# Patient Record
Sex: FEMALE | Race: WHITE | RURAL
[De-identification: names, ages, dates, MRNs, and addresses within clinical notes are randomized per-mention and may not be internally consistent; named-entity substitution may affect disease eponyms.]

---

## 2017-02-28 DIAGNOSIS — F32.A DEPRESSION, UNSPECIFIED DEPRESSION TYPE: ICD-10-CM

## 2017-02-28 RX ORDER — ESCITALOPRAM OXALATE 5 MG/1
5 TABLET ORAL DAILY
Qty: 90 TAB | Refills: 1 | Status: SHIPPED | OUTPATIENT
Start: 2017-02-28 | End: 2017-08-07

## 2017-02-28 NOTE — TELEPHONE ENCOUNTER
Requested Prescriptions     Pending Prescriptions Disp Refills    escitalopram oxalate (LEXAPRO) 5 mg tablet 90 Tab 1     Sig: Take 1 Tab by mouth daily.        Last filled:         9/2/16            #90        1 refill

## 2017-08-07 ENCOUNTER — OFFICE VISIT (OUTPATIENT)
Dept: FAMILY MEDICINE CLINIC | Age: 67
End: 2017-08-07

## 2017-08-07 VITALS
WEIGHT: 137.4 LBS | TEMPERATURE: 96.6 F | HEART RATE: 68 BPM | DIASTOLIC BLOOD PRESSURE: 75 MMHG | RESPIRATION RATE: 16 BRPM | BODY MASS INDEX: 26.83 KG/M2 | SYSTOLIC BLOOD PRESSURE: 130 MMHG

## 2017-08-07 DIAGNOSIS — Z12.11 COLON CANCER SCREENING: ICD-10-CM

## 2017-08-07 DIAGNOSIS — M19.90 ARTHRITIS: ICD-10-CM

## 2017-08-07 DIAGNOSIS — L98.9 SKIN LESION: ICD-10-CM

## 2017-08-07 DIAGNOSIS — Z00.00 MEDICARE ANNUAL WELLNESS VISIT, SUBSEQUENT: Primary | ICD-10-CM

## 2017-08-07 DIAGNOSIS — L71.9 ROSACEA: ICD-10-CM

## 2017-08-07 RX ORDER — SALICYLIC ACID 6 MG/100ML
1 SHAMPOO TOPICAL 2 TIMES WEEKLY
Qty: 1 BOTTLE | Refills: 3 | Status: SHIPPED | OUTPATIENT
Start: 2017-08-07

## 2017-08-07 RX ORDER — DICLOFENAC SODIUM 10 MG/G
4 GEL TOPICAL 4 TIMES DAILY
Qty: 200 G | Refills: 3 | Status: SHIPPED | OUTPATIENT
Start: 2017-08-07

## 2017-08-07 NOTE — PROGRESS NOTES
Chief Complaint   Patient presents with    Medication Refill     lexapro; pt stopped taking 1 month ago; pt says med makes her too drowsy       Kimberly Dietrich is a 78 y.o. female and presents for annual Medicare Wellness Visit. Problem List: Reviewed with patient and discussed risk factors. Patient Active Problem List   Diagnosis Code    Chronic pain G89.29    Migraines G43.909    Rosacea L71.9    Advance directive discussed with patient Z70.80    Osteopenia M85.80    Depression F32.9       Current medical providers:  Patient Care Team:  Nel Gant MD as PCP - General (Family Practice)    PSH: Reviewed with patient  Past Surgical History:   Procedure Laterality Date    HX ORTHOPAEDIC      joint reduction right big toe    HX TONSIL AND ADENOIDECTOMY          SH: Reviewed with patient  Social History   Substance Use Topics    Smoking status: Never Smoker    Smokeless tobacco: Never Used    Alcohol use No       FH: Reviewed with patient  Family History   Problem Relation Age of Onset    Coronary Artery Disease Mother     Diabetes Mother     Heart Disease Mother     Heart Disease Father     Lung Disease Father     Cancer Maternal Grandmother     Arthritis-osteo Maternal Grandfather     Heart Disease Maternal Grandfather     Cancer Paternal Grandmother     Cancer Paternal Grandfather        Medications/Allergies: Reviewed with patient  Current Outpatient Prescriptions on File Prior to Visit   Medication Sig Dispense Refill    diclofenac (VOLTAREN) 1 % gel Apply 4 g to affected area four (4) times daily. 269 g 3    salicylic acid 6 % sham 1 Applicator by Apply Externally route two (2) times a week. 1 Bottle 3    Dapsone (ACZONE) 5 % gel Apply 1 g to affected area two (2) times a day. 1 Tube 1    turmeric root extract 500 mg cap Take  by mouth three (3) times daily.  naproxen sodium (NAPROSYN) 220 mg tablet Take 220 mg by mouth two (2) times daily (with meals).       cromolyn (NASALCROM) 5.2 mg/spray (4 %) nasal spray 1 Spray four (4) times daily.  chlorpheniramine (CHLOR-TRIMETRON) 4 mg tablet Take 4 mg by mouth every six (6) hours as needed for Allergies.  butterbur root extract 50 mg cap Take  by mouth two (2) times a day.  methylsulfonylmethane (MSM) 1,000 mg cap Take  by mouth two (2) times a day.  fexofenadine (ALLEGRA) 180 mg tablet Take 180 mg by mouth as needed.  fluticasone (FLONASE) 50 mcg/actuation nasal spray 2 Sprays by Both Nostrils route daily. 1 Bottle 11    estradiol (ESTRACE) 0.01 % (0.1 mg/gram) vaginal cream daily 42.5 g 11    escitalopram oxalate (LEXAPRO) 5 mg tablet Take 1 Tab by mouth daily. 90 Tab 1    calcium-cholecalciferol, D3, (CALTRATE 600+D) tablet Take 1 Tab by mouth two (2) times a day.  triamcinolone acetonide (KENALOG) 0.025 % topical cream Apply  to affected area two (2) times a day. use thin layer 15 g 0     No current facility-administered medications on file prior to visit. Allergies   Allergen Reactions    Mercury (Elemental) Unknown (comments)    Sulfa (Sulfonamide Antibiotics) Unknown (comments)    Tetracyclines Unknown (comments)       Objective:  Visit Vitals    /75 (BP 1 Location: Left arm, BP Patient Position: Sitting)    Pulse 68    Temp 96.6 °F (35.9 °C) (Oral)    Resp 16    Wt 137 lb 6.4 oz (62.3 kg)    BMI 26.83 kg/m2    Body mass index is 26.83 kg/(m^2).     Assessment of cognitive impairment: Alert and oriented x 3    Depression Screen:   PHQ over the last two weeks 8/7/2017   Little interest or pleasure in doing things Several days   Feeling down, depressed or hopeless Several days   Total Score PHQ 2 2   Trouble falling or staying asleep, or sleeping too much -   Feeling tired or having little energy -   Poor appetite or overeating -   Feeling bad about yourself - or that you are a failure or have let yourself or your family down -   Trouble concentrating on things such as school, work, reading or watching TV -   Moving or speaking so slowly that other people could have noticed; or the opposite being so fidgety that others notice -   Thoughts of being better off dead, or hurting yourself in some way -   PHQ 9 Score -   How difficult have these problems made it for you to do your work, take care of your home and get along with others -       Fall Risk Assessment:    Fall Risk Assessment, last 12 mths 8/7/2017   Able to walk? Yes   Fall in past 12 months? No       Functional Ability:   Does the patient exhibit a steady gait? yes   How long did it take the patient to get up and walk from a sitting position? 2sec   Is the patient self reliant?  (ie can do own laundry, meals, household chores)  yes     Does the patient handle his/her own medications? yes     Does the patient handle his/her own money? yes     Is the patients home safe (ie good lighting, handrails on stairs and bath, etc.)? yes     Did you notice or did patient express any hearing difficulties? no     Did you notice or did patient express any vision difficulties?   no     Were distance and reading eye charts used? no       Advance Care Planning:   Patient was offered the opportunity to discuss advance care planning:  yes     Does patient have an Advance Directive:  yes   If no, did you provide information on Caring Connections? yes       Plan:      No orders of the defined types were placed in this encounter.       Health Maintenance   Topic Date Due    DTaP/Tdap/Td series (1 - Tdap) 07/08/1972    FOBT Q 1 YEAR AGE 50-75  07/08/2001    ZOSTER VACCINE AGE 60>  05/08/2011    GLAUCOMA SCREENING Q2Y  07/08/2016    BREAST CANCER SCRN MAMMOGRAM  07/24/2017    INFLUENZA AGE 9 TO ADULT  08/01/2017    MEDICARE YEARLY EXAM  08/05/2017    Pneumococcal 65+ Low/Medium Risk (2 of 2 - PPSV23) 12/05/2017    Hepatitis C Screening  Addressed    OSTEOPOROSIS SCREENING (DEXA)  Completed       *Patient verbalized understanding and agreement with the plan. A copy of the After Visit Summary with personalized health plan was given to the patient today.     Health Maintenance Due   Topic Date Due    DTaP/Tdap/Td series (1 - Tdap) 07/08/1972    FOBT Q 1 YEAR AGE 50-75  07/08/2001     ZOSTER VACCINE AGE 60>  05/08/2011    GLAUCOMA SCREENING Q2Y  07/08/2016 [facility] (Dr. Neri Parks)    BREAST CANCER SCRN MAMMOGRAM  07/24/2017 sees Dr. Cely Walls in [facility] AGE 9 TO ADULT  08/01/2017    MEDICARE YEARLY EXAM  08/05/2017   Nehemias Martinez LPN

## 2017-08-07 NOTE — MR AVS SNAPSHOT
Visit Information Date & Time Provider Department Dept. Phone Encounter #  
 8/7/2017  2:00 PM Cesario Soto  [facility] [phone] [id number] Follow-up Instructions Follow-up and Disposition History Your Appointments 8/9/2018  3:00 PM  
Medicare Physical with Cesario Soto MD  
[facility] ([facility]) Appt Note: ,CP$0/km/8. 7.2017  
 [address] [facility] [phone]  
[phone]  
  
   
 [address] [address] Upcoming Health Maintenance Date Due FOBT Q 1 YEAR AGE 50-75 7/8/2001 GLAUCOMA SCREENING Q2Y 7/8/2016 BREAST CANCER SCRN MAMMOGRAM 7/24/2017 Pneumococcal 65+ Low/Medium Risk (2 of 2 - PPSV23) 12/5/2017 MEDICARE YEARLY EXAM 8/8/2018 DTaP/Tdap/Td series (2 - Td) 8/7/2027 Allergies as of 8/7/2017  Review Complete On: 8/7/2017 By: Sushma Concepcion LPN Severity Noted Reaction Type Reactions Mercury (Elemental)  05/16/2013    Unknown (comments) Sulfa (Sulfonamide Antibiotics)  05/16/2013    Unknown (comments) Tetracyclines  05/16/2013    Unknown (comments) Current Immunizations  Reviewed on 5/16/2013 Name Date Influenza High Dose Vaccine PF 12/5/2016 Influenza Vaccine 11/26/2012 Pneumococcal Conjugate (PCV-13) 12/5/2016 Not reviewed this visit You Were Diagnosed With   
  
 Codes Comments Medicare annual wellness visit, subsequent    -  Primary ICD-10-CM: Z00.00 ICD-9-CM: V70.0 Colon cancer screening     ICD-10-CM: Z12.11 ICD-9-CM: V76.51 Arthritis     ICD-10-CM: M19.90 ICD-9-CM: 716.90 Rosacea     ICD-10-CM: L71.9 ICD-9-CM: 695.3 Skin lesion     ICD-10-CM: L98.9 ICD-9-CM: 709.9 Vitals  BP Pulse Temp Resp Weight(growth percentile) BMI  
 130/75 (BP 1 Location: Left arm, BP Patient Position: Sitting) 68 96.6 °F (35.9 °C) (Oral) 16 137 lb 6.4 oz (62.3 kg) 26.83 kg/m2 OB Status Smoking Status Postmenopausal Never Smoker BMI and BSA Data Body Mass Index Body Surface Area  
 26.83 kg/m 2 1.62 m 2 Preferred Pharmacy Pharmacy Name Phone [facility] [address], [address] [place] [address] Your Updated Medication List  
  
   
This list is accurate as of: 8/7/17  3:29 PM.  Always use your most recent med list. ALLEGRA 180 mg tablet Generic drug:  fexofenadine Take 180 mg by mouth as needed. butterbur root extract 50 mg Cap Take  by mouth two (2) times a day. chlorpheniramine 4 mg tablet Commonly known as:  CHLOR-TRIMETRON Take 4 mg by mouth every six (6) hours as needed for Allergies. cromolyn 5.2 mg/spray (4 %) nasal spray Commonly known as:  NASALCROM  
1 Spray four (4) times daily. Dapsone 5 % Gel Commonly known as:  ACZONE Apply 1 g to affected area two (2) times a day. diclofenac 1 % Gel Commonly known as:  VOLTAREN Apply 4 g to affected area four (4) times daily. estradiol 0.01 % (0.1 mg/gram) vaginal cream  
Commonly known as:  ESTRACE  
daily  
  
 fluticasone 50 mcg/actuation nasal spray Commonly known as:  Eloy Elana 2 Sprays by Both Nostrils route daily. MSM 1,000 mg Cap Generic drug:  methylsulfonylmethane Take  by mouth two (2) times a day. naproxen sodium 220 mg tablet Commonly known as:  NAPROSYN Take 220 mg by mouth two (2) times daily (with meals). salicylic acid 6 % Sham  
1 Applicator by Apply Externally route two (2) times a week. turmeric root extract 500 mg Cap Take  by mouth three (3) times daily. Prescriptions Sent to Pharmacy Refills  
 diclofenac (VOLTAREN) 1 % gel 3 Sig: Apply 4 g to affected area four (4) times daily.   
 Class: Normal  
 [facility], [address] [place] [address] [address] Ph #: [phone] Route: Topical  
 salicylic acid 6 % sham 3 Si Applicator by Apply Externally route two (2) times a week. Class: Normal  
 Pharmacy: [facility] [address], [address] [place] [address] [address] Ph #: [phone] Route: Apply Externally We Performed the Following CBC WITH AUTOMATED DIFF [20094 CPT(R)] METABOLIC PANEL, COMPREHENSIVE [76184 CPT(R)] To-Do List   
 2017 Lab:  OCCULT BLOOD, IMMUNOASSAY (FIT) Patient Instructions Schedule of Personalized Health Plan (Provide Copy to Patient) The best way to stay healthy is to live a healthy lifestyle. A healthy lifestyle includes regular exercise, eating a well-balanced diet, keeping a healthy weight and not smoking. Regular physical exams and screening tests are another important way to take care of yourself. Preventive exams provided by health care providers can find health problems early when treatment works best and can keep you from getting certain diseases or illnesses. Preventive services include exams, lab tests, screenings, shots, monitoring and information to help you take care of your own health. All people over 65 should have a pneumonia shot. Pneumonia shots are usually only needed once in a lifetime unless your doctor decides differently. All people over 65 should have a yearly flu shot. People over 65 are at medium to high risk for Hepatitis B. Three shots are needed for complete protection. In addition to your physical exam, some screening tests are recommended: 
 
Bone mass measurement (dexa scan) is recommended every two years Diabetes Mellitus screening is recommended every year. Glaucoma is an eye disease caused by high pressure in the eye. An eye exam is recommended every year.   
 
Cardiovascular screening tests that check your cholesterol and other blood fat (lipid) levels are recommended every five years. Colorectal Cancer screening tests help to find pre-cancerous polyps (growths in the colon) so they can be removed before they turn into cancer. Tests ordered for screening depend on your personal and family history risk factors. Screening for Breast Cancer is recommended yearly with a mammogram. 
 
Screening for Cervical Cancer is recommended every two years (annually for certain risk factors, such as previous history of STD or abnormal PAP in past 7 years), with a Pelvic Exam with PAP Here is a list of your current Health Maintenance items with a due date: 
Health Maintenance Topic Date Due  
 DTaP/Tdap/Td series (1 - Tdap) 07/08/1972  
 FOBT Q 1 YEAR AGE 50-75  07/08/2001  ZOSTER VACCINE AGE 60>  05/08/2011  GLAUCOMA SCREENING Q2Y  07/08/2016  BREAST CANCER SCRN MAMMOGRAM  07/24/2017  INFLUENZA AGE 9 TO ADULT  08/01/2017  MEDICARE YEARLY EXAM  08/05/2017  Pneumococcal 65+ Low/Medium Risk (2 of 2 - PPSV23) 12/05/2017  Hepatitis C Screening  Addressed  OSTEOPOROSIS SCREENING (DEXA)  Completed Introducing [facility]! [employer] introduces [employer] patient portal. Now you can access parts of your medical record, email your doctor's office, and request medication refills online. 1. In your internet browser, go to [email or web address] [employer] 2. Click on the First Time User? Click Here link in the Sign In box. You will see the New Member Sign Up page. 3. Enter your [employer] Access Code exactly as it appears below. You will not need to use this code after youve completed the sign-up process. If you do not sign up before the expiration date, you must request a new code. · [employer] Access Code: [email or web address] Expires: 11/5/2017  2:11 PM 
 
4. Enter the last four digits of your Social Security Number (xxxx) and Date of Birth (mm/dd/yyyy) as indicated and click Submit.  You will be taken to the next sign-up page. 5. Create a [employer] ID. This will be your [employer] login ID and cannot be changed, so think of one that is secure and easy to remember. 6. Create a [employer] password. You can change your password at any time. 7. Enter your Password Reset Question and Answer. This can be used at a later time if you forget your password. 8. Enter your e-mail address. You will receive e-mail notification when new information is available in [address]. 9. Click Sign Up. You can now view and download portions of your medical record. 10. Click the Download Summary menu link to download a portable copy of your medical information. If you have questions, please visit the Frequently Asked Questions section of the [employer] website. Remember, [employer] is NOT to be used for urgent needs. For medical emergencies, dial 911. Now available from your iPhone and Android! Please provide this summary of care documentation to your next provider. Your primary care clinician is listed as Dania Mullins. If you have any questions after today's visit, please call [phone].

## 2017-08-07 NOTE — PATIENT INSTRUCTIONS
Schedule of Personalized Health Plan  (Provide Copy to Patient)  The best way to stay healthy is to live a healthy lifestyle. A healthy lifestyle includes regular exercise, eating a well-balanced diet, keeping a healthy weight and not smoking. Regular physical exams and screening tests are another important way to take care of yourself. Preventive exams provided by health care providers can find health problems early when treatment works best and can keep you from getting certain diseases or illnesses. Preventive services include exams, lab tests, screenings, shots, monitoring and information to help you take care of your own health. All people over 65 should have a pneumonia shot. Pneumonia shots are usually only needed once in a lifetime unless your doctor decides differently. All people over 65 should have a yearly flu shot. People over 65 are at medium to high risk for Hepatitis B. Three shots are needed for complete protection. In addition to your physical exam, some screening tests are recommended:    Bone mass measurement (dexa scan) is recommended every two years  Diabetes Mellitus screening is recommended every year. Glaucoma is an eye disease caused by high pressure in the eye. An eye exam is recommended every year. Cardiovascular screening tests that check your cholesterol and other blood fat (lipid) levels are recommended every five years. Colorectal Cancer screening tests help to find pre-cancerous polyps (growths in the colon) so they can be removed before they turn into cancer. Tests ordered for screening depend on your personal and family history risk factors.     Screening for Breast Cancer is recommended yearly with a mammogram.    Screening for Cervical Cancer is recommended every two years (annually for certain risk factors, such as previous history of STD or abnormal PAP in past 7 years), with a Pelvic Exam with PAP    Here is a list of your current Health Maintenance items with a due date:  Health Maintenance   Topic Date Due    DTaP/Tdap/Td series (1 - Tdap) 07/08/1972    FOBT Q 1 YEAR AGE 50-75  07/08/2001    ZOSTER VACCINE AGE 60>  05/08/2011    GLAUCOMA SCREENING Q2Y  07/08/2016    BREAST CANCER SCRN MAMMOGRAM  07/24/2017    INFLUENZA AGE 9 TO ADULT  08/01/2017    MEDICARE YEARLY EXAM  08/05/2017    Pneumococcal 65+ Low/Medium Risk (2 of 2 - PPSV23) 12/05/2017    Hepatitis C Screening  Addressed    OSTEOPOROSIS SCREENING (DEXA)  Completed

## 2017-08-07 NOTE — PROGRESS NOTES
HISTORY OF PRESENT ILLNESS  Edith Stearns is a 78 y.o. female. Chief Complaint   Patient presents with    Medication Refill     lexapro; pt stopped taking 1 month ago; pt says med makes her too drowsy   [address]       HPI  Here for Medicare well    Was depressed last year  Stopped it   Was too sedating  Doing ok now  Does not need meds  But poss in Oct    Needs Voltaren gel for OA  Seeing Rheumatologist now occ    Left ankle pain   Worse last week in bed  Caused cramp  No redness or swelling  No injury  NSAIDS help, takes it ok, Tylenol not helping    Hx of Psoriasis and OA  Needs Rx's    Review of Systems   Constitutional: Negative for weight loss. Eyes: Negative for blurred vision. Respiratory: Negative for cough and shortness of breath. Cardiovascular: Negative for chest pain, palpitations, orthopnea and leg swelling. Gastrointestinal: Negative for blood in stool. Anal fissure   Genitourinary: Negative for frequency and hematuria. Musculoskeletal: Positive for joint pain (left ankle). Negative for falls. Skin:        Left ant thigh with skin lesion that has gotten bigger   Neurological: Positive for dizziness (sometimes). Negative for headaches. Endo/Heme/Allergies: Negative for polydipsia. Psychiatric/Behavioral: Negative for depression. Past Medical History:   Diagnosis Date    Arthritis     Chronic pain     Depression     Migraines     Osteopenia     Psoriasis     Rosacea     Seborrhea adiposa     Vaginal atrophy      Current Outpatient Prescriptions   Medication Sig Dispense Refill    diclofenac (VOLTAREN) 1 % gel Apply 4 g to affected area four (4) times daily. 217 g 3    salicylic acid 6 % sham 1 Applicator by Apply Externally route two (2) times a week. 1 Bottle 3    Dapsone (ACZONE) 5 % gel Apply 1 g to affected area two (2) times a day. 1 Tube 1    turmeric root extract 500 mg cap Take  by mouth three (3) times daily.       naproxen sodium (NAPROSYN) 220 mg tablet Take 220 mg by mouth two (2) times daily (with meals).  cromolyn (NASALCROM) 5.2 mg/spray (4 %) nasal spray 1 Spray four (4) times daily.  chlorpheniramine (CHLOR-TRIMETRON) 4 mg tablet Take 4 mg by mouth every six (6) hours as needed for Allergies.  butterbur root extract 50 mg cap Take  by mouth two (2) times a day.  methylsulfonylmethane (MSM) 1,000 mg cap Take  by mouth two (2) times a day.  fexofenadine (ALLEGRA) 180 mg tablet Take 180 mg by mouth as needed.  fluticasone (FLONASE) 50 mcg/actuation nasal spray 2 Sprays by Both Nostrils route daily. 1 Bottle 11    estradiol (ESTRACE) 0.01 % (0.1 mg/gram) vaginal cream daily 42.5 g 11     Allergies   Allergen Reactions    Mercury (Elemental) Unknown (comments)    Sulfa (Sulfonamide Antibiotics) Unknown (comments)    Tetracyclines Unknown (comments)     Visit Vitals    /75 (BP 1 Location: Left arm, BP Patient Position: Sitting)    Pulse 68    Temp 96.6 °F (35.9 °C) (Oral)    Resp 16    Wt 137 lb 6.4 oz (62.3 kg)    BMI 26.83 kg/m2     Physical Exam   Constitutional: She is oriented to person, place, and time. She appears well-developed and well-nourished. No distress. HENT:   Head: Normocephalic and atraumatic. Right Ear: External ear normal.   Left Ear: External ear normal.   Mouth/Throat: Oropharynx is clear and moist. No oropharyngeal exudate. Eyes: Conjunctivae and EOM are normal. Pupils are equal, round, and reactive to light. Cardiovascular: Normal rate, regular rhythm, normal heart sounds and intact distal pulses. Pulmonary/Chest: Effort normal and breath sounds normal.   Abdominal: Soft. She exhibits no distension. There is no tenderness. Musculoskeletal: Normal range of motion. She exhibits no edema or tenderness. Lymphadenopathy:     She has no cervical adenopathy. Neurological: She is alert and oriented to person, place, and time. Skin: Skin is warm and dry. Left ant thigh with light brown and darker areas , irregular in shape and color, more than about 2 by 1.5 cm   Psychiatric: She has a normal mood and affect. Nursing note and vitals reviewed. ASSESSMENT and PLAN    ICD-10-CM ICD-9-CM    1. Medicare annual wellness visit, subsequent Z00.00 V70.0    2. Colon cancer screening Z12.11 V76.51 OCCULT BLOOD, IMMUNOASSAY (FIT)   3. Arthritis M19.90 716.90 diclofenac (VOLTAREN) 1 % gel      METABOLIC PANEL, COMPREHENSIVE   4. Rosacea B98.6 526.3 salicylic acid 6 % sham      CBC WITH AUTOMATED DIFF   5.  Skin lesion L98.9 709.9    set up for BX of skin lesion

## 2017-08-07 NOTE — ACP (ADVANCE CARE PLANNING)
Advance Care Planning (ACP) Provider Conversation Snapshot    Date of ACP Conversation: 08/07/17  Persons included in Conversation:  patient  Length of ACP Conversation in minutes:  <16 minutes (Non-Billable)    ACP is on file and up to date

## 2017-08-08 ENCOUNTER — TELEPHONE (OUTPATIENT)
Dept: FAMILY MEDICINE CLINIC | Age: 67
End: 2017-08-08

## 2017-08-08 LAB
ALBUMIN SERPL-MCNC: 4.8 G/DL (ref 3.6–4.8)
ALBUMIN/GLOB SERPL: 2 {RATIO} (ref 1.2–2.2)
ALP SERPL-CCNC: 51 IU/L (ref 39–117)
ALT SERPL-CCNC: 9 IU/L (ref 0–32)
AST SERPL-CCNC: 17 IU/L (ref 0–40)
BASOPHILS # BLD AUTO: 0 X10E3/UL (ref 0–0.2)
BASOPHILS NFR BLD AUTO: 0 %
BILIRUB SERPL-MCNC: 0.2 MG/DL (ref 0–1.2)
BUN SERPL-MCNC: 17 MG/DL (ref 8–27)
BUN/CREAT SERPL: 27 (ref 12–28)
CALCIUM SERPL-MCNC: 9.8 MG/DL (ref 8.7–10.3)
CHLORIDE SERPL-SCNC: 101 MMOL/L (ref 96–106)
CO2 SERPL-SCNC: 26 MMOL/L (ref 18–29)
CREAT SERPL-MCNC: 0.63 MG/DL (ref 0.57–1)
EOSINOPHIL # BLD AUTO: 0.1 X10E3/UL (ref 0–0.4)
EOSINOPHIL NFR BLD AUTO: 1 %
ERYTHROCYTE [DISTWIDTH] IN BLOOD BY AUTOMATED COUNT: 12.8 % (ref 12.3–15.4)
GLOBULIN SER CALC-MCNC: 2.4 G/DL (ref 1.5–4.5)
GLUCOSE SERPL-MCNC: 105 MG/DL (ref 65–99)
HCT VFR BLD AUTO: 41.1 % (ref 34–46.6)
HGB BLD-MCNC: 13.2 G/DL (ref 11.1–15.9)
IMM GRANULOCYTES # BLD: 0 X10E3/UL (ref 0–0.1)
IMM GRANULOCYTES NFR BLD: 0 %
LYMPHOCYTES # BLD AUTO: 2.8 X10E3/UL (ref 0.7–3.1)
LYMPHOCYTES NFR BLD AUTO: 38 %
MCH RBC QN AUTO: 28.1 PG (ref 26.6–33)
MCHC RBC AUTO-ENTMCNC: 32.1 G/DL (ref 31.5–35.7)
MCV RBC AUTO: 87 FL (ref 79–97)
MONOCYTES # BLD AUTO: 0.6 X10E3/UL (ref 0.1–0.9)
MONOCYTES NFR BLD AUTO: 8 %
NEUTROPHILS # BLD AUTO: 3.7 X10E3/UL (ref 1.4–7)
NEUTROPHILS NFR BLD AUTO: 53 %
PLATELET # BLD AUTO: 272 X10E3/UL (ref 150–379)
POTASSIUM SERPL-SCNC: 4.9 MMOL/L (ref 3.5–5.2)
PROT SERPL-MCNC: 7.2 G/DL (ref 6–8.5)
RBC # BLD AUTO: 4.7 X10E6/UL (ref 3.77–5.28)
SODIUM SERPL-SCNC: 142 MMOL/L (ref 134–144)
WBC # BLD AUTO: 7.2 X10E3/UL (ref 3.4–10.8)

## 2017-08-08 NOTE — PROGRESS NOTES
Call pt, the Glucose is a little up at 105, we will monitor  The kidney and liver tests are normal  The CBC is normal

## 2017-08-11 LAB — HEMOCCULT STL QL IA: NEGATIVE

## 2017-08-11 NOTE — PROGRESS NOTES
I have called and spoken to this patient and advised of lab results per Dr Marielos Garcia review of her results.   Kaia Martínez RN

## 2018-08-02 ENCOUNTER — TELEPHONE (OUTPATIENT)
Dept: FAMILY MEDICINE CLINIC | Age: 68
End: 2018-08-02
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
